# Patient Record
Sex: FEMALE
[De-identification: names, ages, dates, MRNs, and addresses within clinical notes are randomized per-mention and may not be internally consistent; named-entity substitution may affect disease eponyms.]

---

## 2019-02-15 ENCOUNTER — HOSPITAL ENCOUNTER (EMERGENCY)
Dept: HOSPITAL 97 - ER | Age: 29
LOS: 1 days | Discharge: HOME | End: 2019-02-16
Payer: COMMERCIAL

## 2019-02-15 DIAGNOSIS — K29.80: ICD-10-CM

## 2019-02-15 DIAGNOSIS — I88.0: ICD-10-CM

## 2019-02-15 DIAGNOSIS — K29.70: Primary | ICD-10-CM

## 2019-02-15 LAB
ALBUMIN SERPL BCP-MCNC: 4.5 G/DL (ref 3.4–5)
ALP SERPL-CCNC: 56 U/L (ref 45–117)
ALT SERPL W P-5'-P-CCNC: 13 U/L (ref 12–78)
AST SERPL W P-5'-P-CCNC: 11 U/L (ref 15–37)
BUN BLD-MCNC: 6 MG/DL (ref 7–18)
GLUCOSE SERPLBLD-MCNC: 104 MG/DL (ref 74–106)
HCT VFR BLD CALC: 38 % (ref 36–45)
LIPASE SERPL-CCNC: 65 U/L (ref 73–393)
LYMPHOCYTES # SPEC AUTO: 1.2 K/UL (ref 0.7–4.9)
PMV BLD: 9.8 FL (ref 7.6–11.3)
POTASSIUM SERPL-SCNC: 3.3 MMOL/L (ref 3.5–5.1)
RBC # BLD: 4.21 M/UL (ref 3.86–4.86)

## 2019-02-15 PROCEDURE — 99284 EMERGENCY DEPT VISIT MOD MDM: CPT

## 2019-02-15 PROCEDURE — 81025 URINE PREGNANCY TEST: CPT

## 2019-02-15 PROCEDURE — 83690 ASSAY OF LIPASE: CPT

## 2019-02-15 PROCEDURE — 96375 TX/PRO/DX INJ NEW DRUG ADDON: CPT

## 2019-02-15 PROCEDURE — 80076 HEPATIC FUNCTION PANEL: CPT

## 2019-02-15 PROCEDURE — 96365 THER/PROPH/DIAG IV INF INIT: CPT

## 2019-02-15 PROCEDURE — 36415 COLL VENOUS BLD VENIPUNCTURE: CPT

## 2019-02-15 PROCEDURE — 80048 BASIC METABOLIC PNL TOTAL CA: CPT

## 2019-02-15 PROCEDURE — 81003 URINALYSIS AUTO W/O SCOPE: CPT

## 2019-02-15 PROCEDURE — 85025 COMPLETE CBC W/AUTO DIFF WBC: CPT

## 2019-02-15 PROCEDURE — 74177 CT ABD & PELVIS W/CONTRAST: CPT

## 2019-02-15 PROCEDURE — 84703 CHORIONIC GONADOTROPIN ASSAY: CPT

## 2019-02-16 NOTE — EDPHYS
Physician Documentation                                                                           

 [facility]                                                                

Name: Winnie May                                                                                

Age: 28 yrs                                                                                       

Sex: Female                                                                                       

: 1990                                                                                   

MRN: [id number]                                                                                   

Arrival Date: 02/15/2019                                                                          

Time: 21:50                                                                                       

Account#: [id number]                                                                            

Bed 6                                                                                             

Private MD:                                                                                       

ED Physician Sandeep Tidwell                                                                    

HPI:                                                                                              

02/15                                                                                             

22:42 This 28 yrs old  Female presents to ER via Ambulatory with complaints of abd    ma2 

      pain.                                                                                       

22:42 The patient presents with abdominal pain. Onset: The symptoms/episode began/occurred    ma2 

      gradually, 1 day(s) ago. Associated signs and symptoms: Pertinent negatives: nausea and     

      vomiting, blood in stools, diarrhea, shortness of breath, vomiting. The symptoms are        

      described as achy. Severity of pain: At its worst the pain was moderate in the              

      emergency department the pain is unchanged. The patient has not experienced similar         

      symptoms in the past.                                                                       

                                                                                                  

OB/GYN:                                                                                           

21:53 LMP 2019                                                                            ak1 

                                                                                                  

Historical:                                                                                       

- Allergies:                                                                                      

21:53 No Known Allergies;                                                                     ak1 

- Home Meds:                                                                                      

21:53 None [Active];                                                                          ak1 

- PMHx:                                                                                           

21:53 None;                                                                                   ak1 

- PSHx:                                                                                           

21:53 None;                                                                                   ak1 

                                                                                                  

- Immunization history:: Adult Immunizations unknown.                                             

- Social history:: Smoking status: Patient/guardian denies using tobacco,                         

  Patient/guardian denies using alcohol, street drugs, The patient lives with family.             

- Ebola Screening: : No symptoms or risks identified at this time.                                

- Family history:: not pertinent.                                                                 

                                                                                                  

                                                                                                  

ROS:                                                                                              

22:42 Constitutional: Negative for fever, chills, and weight loss.                            ma2 

22:42 Abdomen/GI: Positive for abdominal pain, Negative for nausea and vomiting, nausea,          

      vomiting, and diarrhea, vomiting, abdominal distension, rectal bleeding, flatulence.        

22:42 All other systems are negative.                                                             

                                                                                                  

Exam:                                                                                             

22:42 Constitutional:  This is a well developed, well nourished patient who is awake, alert,  ma2 

      and in no acute distress. Chest/axilla:  Normal chest wall appearance and motion.           

      Nontender with no deformity.  No lesions are appreciated. Cardiovascular:  Regular rate     

      and rhythm with a normal S1 and S2.  No gallops, murmurs, or rubs.  Normal PMI, no JVD.     

       No pulse deficits. Respiratory:  Lungs have equal breath sounds bilaterally, clear to      

      auscultation and percussion.  No rales, rhonchi or wheezes noted.  No increased work of     

      breathing, no retractions or nasal flaring. Abdomen/GI:  Soft, non-tender, with normal      

      bowel sounds.  No distension or tympany.  No guarding or rebound.  No evidence of           

      tenderness throughout. Back:  No spinal tenderness.  No costovertebral tenderness.          

      Full range of motion. MS/ Extremity:  Pulses equal, no cyanosis.  Neurovascular intact.     

       Full, normal range of motion. Neuro:  Awake and alert, GCS 15, oriented to person,         

      place, time, and situation.  Cranial nerves II-XII grossly intact.  Motor strength 5/5      

      in all extremities.  Sensory grossly intact.  Cerebellar exam normal.  Normal gait.         

                                                                                                  

Vital Signs:                                                                                      

21:53  / 73; Pulse 91; Resp 18; Temp 99.0(O); Pulse Ox 100% on R/A; Weight 77.11 kg     ak1 

      (R); Height 5 ft. 7 in. (170.18 cm) (R); Pain 6/10;                                         

22:25  / 69; Pulse 88; Resp 17; Pulse Ox 100% on R/A; Pain 2/10;                        tl1 

23:29  / 75; Pulse 79; Resp 17; Pulse Ox 100% ; Pain 0/10;                              tl1 

                                                                                             

00:35  / 66; Pulse 77; Resp 17; Temp 98.7; Pulse Ox 100% ; Pain 0/10;                   tl1 

02/15                                                                                             

21:53 Body Mass Index 26.63 (77.11 kg, 170.18 cm)                                             ak1 

                                                                                                  

MDM:                                                                                              

02/15                                                                                             

21:50 Patient medically screened.                                                             ma2 

22:42 Differential diagnosis: diverticulitis, Ectopic Pregnancy, gastritis, gastroesophageal  ma2 

      reflux disease, Irritable bowel syndrome.                                                   

                                                                                             

00:26 Data reviewed: vital signs, nurses notes. Counseling: I had a detailed discussion with  ma2 

      the patient and/or guardian regarding: the historical points, exam findings, and any        

      diagnostic results supporting the discharge/admit diagnosis, the presence of at least       

      one elevated blood pressure reading (>120/80) during this emergency department visit.       

      Response to treatment: the patient's symptoms have markedly improved after treatment.       

                                                                                                  

02/15                                                                                             

22:07 Order name: Urine Dipstick--Ancillary (enter results); Complete Time: 23:38             ag4 

02/15                                                                                             

22:07 Order name: Urine Pregnancy--Ancillary (enter results); Complete Time: 23:38            4 

02/15                                                                                             

22:09 Order name: Basic Metabolic Panel; Complete Time: 23:38                                 ma2 

02/15                                                                                             

22:09 Order name: CBC with Diff; Complete Time: 23:38                                         ma2 

02/15                                                                                             

22:09 Order name: Creatinine for Radiology; Complete Time: 23:38                              ma2 

02/15                                                                                             

22:09 Order name: Hepatic Function; Complete Time: 23:38                                      ma2 

02/15                                                                                             

22:09 Order name: Lipase; Complete Time: 23:38                                                ma2 

02/15                                                                                             

22:09 Order name: IV Saline Lock; Complete Time: 22:34                                        ma2 

02/15                                                                                             

22:09 Order name: Labs collected and sent; Complete Time: 22:34                               [facility] 

02/15                                                                                             

22:09 Order name: Pregnancy Test, Serum; Complete Time: 23:38                                 ma2 

02/15                                                                                             

22:09 Order name: CT Abd/Pelvis - W/Contrast                                                  [facility] 

02/15                                                                                             

22:09 Order name: Urine Dipstick-Ancillary (obtain specimen); Complete Time: 22:22            ma2 

                                                                                                  

Administered Medications:                                                                         

02/15                                                                                             

22:21 Drug: Rocephin 1 grams Route: IV; Rate: calculated rate; Infused Over: 5 mins; Site:    tl1 

      right antecubital;                                                                          

22:53 Follow up: IV Status: Completed infusion                                                tl1 

22:21 Drug: TORadol 30 mg Route: IVP; Infused Over: 2 mins; Site: right antecubital;          tl1 

22:53 Follow up: Response: No adverse reaction; Marked relief of symptoms; Pain is decreased  tl1 

22:22 Drug: NS 0.9% 1000 ml Route: IV; Rate: 1 bolus; Site: right antecubital;                tl1 

22:53 Follow up: IV Status: Completed infusion                                                tl1 

                                                                                                  

                                                                                                  

Disposition:                                                                                      

19 00:26 Discharged to Home. Impression: Gastritis and duodenitis.                          

- Condition is Stable.                                                                            

- Discharge Instructions: Gastritis, Adult.                                                       

- Prescriptions for Tylenol- Codeine #3 300-30 mg Oral Tablet - take 2 tablet by ORAL             

  route every 6 hours As needed; 30 tablet. Pepcid 20 mg Oral Tablet - take 1 tablet by           

  ORAL route once daily for 10 days; 10 tablet.                                                   

- Medication Reconciliation Form, Thank You Letter, Antibiotic Education, Prescription            

  Opioid Use form.                                                                                

- Follow up: Private Physician; When: Tomorrow; Reason: Continuance of care.                      

                                                                                                  

                                                                                                  

                                                                                                  

Signatures:                                                                                       

Dispatcher [employer]                           Obdulia Cunningham RN                      RN   tl1                                                  

Margoth Verdin RN                       RN   ak1                                                  

Sandeep Tidwell MD MD   ma2                                                  

                                                                                                  

Corrections: (The following items were deleted from the chart)                                    

                                                                                             

00:37 00:26 2019 00:26 Discharged to Home. Impression: Gastritis and duodenitis.        tl1 

      Condition is Stable. Forms are Medication Reconciliation Form, Thank You Letter,            

      Antibiotic Education, Prescription Opioid Use. Follow up: Private Physician; When:          

      Tomorrow; Reason: Continuance of care. ma2                                                  

                                                                                                  

**************************************************************************************************

## 2019-02-16 NOTE — ER
Nurse's Notes                                                                                     

 [facility]                                                                

Name: Winnie May                                                                                

Age: 28 yrs                                                                                       

Sex: Female                                                                                       

: 1990                                                                                   

MRN: [id number]                                                                                   

Arrival Date: 02/15/2019                                                                          

Time: 21:50                                                                                       

Account#: [id number]                                                                            

Bed 6                                                                                             

Private MD:                                                                                       

Diagnosis: Gastritis and duodenitis                                                               

                                                                                                  

Presentation:                                                                                     

02/15                                                                                             

21:52 Presenting complaint: Patient states: left lower abd pain that radiates to left flank.  ak1 

      pt c/o vomiting X1 this morning and diarrhea X1 this morning. pt denies burning with        

      urination, pt denies N/V at this time. Transition of care: patient was not received         

      from another setting of care. Onset of symptoms was February 15, 2019. Risk Assessment:     

      Do you want to hurt yourself or someone else? Patient reports no desire to harm self or     

      others. Initial Sepsis Screen: Does the patient meet any 2 criteria? No. Patient's          

      initial sepsis screen is negative. Does the patient have a suspected source of              

      infection? No. Patient's initial sepsis screen is negative. Care prior to arrival: None.    

21:52 Method Of Arrival: Ambulatory                                                           ak 

21:52 Acuity: MONTSERRAT 3                                                                           ak1 

                                                                                                  

Triage Assessment:                                                                                

21:53 General: Appears in no apparent distress. Behavior is calm, cooperative, anxious.       ak1 

                                                                                                  

OB/GYN:                                                                                           

21:53 LMP 2019                                                                            ak 

                                                                                                  

Historical:                                                                                       

- Allergies:                                                                                      

21:53 No Known Allergies;                                                                     ak1 

- Home Meds:                                                                                      

21:53 None [Active];                                                                          ak1 

- PMHx:                                                                                           

21:53 None;                                                                                   ak1 

- PSHx:                                                                                           

21:53 None;                                                                                   ak1 

                                                                                                  

- Immunization history:: Adult Immunizations unknown.                                             

- Social history:: Smoking status: Patient/guardian denies using tobacco,                         

  Patient/guardian denies using alcohol, street drugs, The patient lives with family.             

- Ebola Screening: : No symptoms or risks identified at this time.                                

- Family history:: not pertinent.                                                                 

                                                                                                  

                                                                                                  

Screenin:01 Abuse screen: Denies threats or abuse. Nutritional screening: No deficits noted.        jd3 

      Tuberculosis screening: No symptoms or risk factors identified. Fall Risk Ambulatory        

      Aid- None/Bed Rest/Nurse Assist (0 pts). Gait- Normal/Bed Rest/Wheelchair (0 pts)           

      Mental Status- Oriented to own ability (0 pts). Total Lucio Fall Scale indicates No         

      Risk (0-24 pts).                                                                            

                                                                                                  

Assessment:                                                                                       

21:58 General: Appears in no apparent distress. uncomfortable, Behavior is calm, cooperative, jd3 

      appropriate for age. Pain: Complains of pain in posterior aspect of left lateral            

      abdomen Quality of pain is described as aching, tender. Neuro: Level of Consciousness       

      is awake, alert, obeys commands, Oriented to person, place, time, situation.                

      Cardiovascular: Capillary refill < 3 seconds Patient's skin is warm and dry.                

      Respiratory: Airway is patent Respiratory effort is even, unlabored, Respiratory            

      pattern is regular, symmetrical. GI: Abdomen is flat, non-distended, Bowel sounds           

      present X 4 quads. Abdomen is tender to palpation in posterior aspect of left lateral       

      abdomen, left upper quadrant and left lower quadrant Reports. : Reports small amount      

      of blood in the urine. EENT: No signs and/or symptoms were reported regarding the EENT      

      system. Derm: Skin is intact, Skin is dry, Skin is normal, Skin temperature is warm.        

      Musculoskeletal: Circulation, motion, and sensation intact. Range of motion: intact in      

      all extremities.                                                                            

                                                                                             

00:35 Reassessment: Patient and/or family updated on plan of care and expected duration. Pain tl1 

      level reassessed. Patient is alert, oriented x 3, equal unlabored respirations, skin        

      warm/dry/pink. Patient states feeling better. Patient states symptoms have improved.        

      GI: Bowel sounds present X 4 quads. Abd is soft and non tender X 4 quads.                   

                                                                                                  

Vital Signs:                                                                                      

02/15                                                                                             

21:53  / 73; Pulse 91; Resp 18; Temp 99.0(O); Pulse Ox 100% on R/A; Weight 77.11 kg     ak1 

      (R); Height 5 ft. 7 in. (170.18 cm) (R); Pain 6/10;                                         

22:25  / 69; Pulse 88; Resp 17; Pulse Ox 100% on R/A; Pain 2/10;                        tl1 

23:29  / 75; Pulse 79; Resp 17; Pulse Ox 100% ; Pain 0/10;                              tl1 

                                                                                             

00:35  / 66; Pulse 77; Resp 17; Temp 98.7; Pulse Ox 100% ; Pain 0/10;                   tl1 

02/15                                                                                             

21:53 Body Mass Index 26.63 (77.11 kg, 170.18 cm)                                             ak1 

                                                                                                  

ED Course:                                                                                        

02/15                                                                                             

21:50 Patient arrived in ED.                                                                  ak1 

21:50 Sandeep Tidwell MD is Attending Physician.                                           ma2 

21:53 Triage completed.                                                                       ak1 

21:53 Live Corea, RN is Primary Nurse.                                                  jd3 

21:53 Arm band placed on Patient placed in an exam room, on a stretcher, Patient notified of  ak1 

      wait time.                                                                                  

22:01 Patient has correct armband on for positive identification. Placed in gown. Bed in low  jd3 

      position. Call light in reach. Side rails up X 1. Adult w/ patient.                         

22:05 No provider procedures requiring assistance completed. Inserted saline lock: 20 gauge   tl1 

      in right antecubital area, using aseptic technique. Blood collected.                        

22:10 Radiology exam delayed due to lab results not completed at this time. (BUN/Creatinine). 2 

22:51 Patient moved to CT via wheelchair.                                                     nj  

23:02 CT completed. Patient tolerated procedure well. Patient moved back from CT.             nj  

23:13 CT Abd/Pelvis - W/Contrast In Process Unspecified.                                      EDMS

                                                                                             

00:37 IV discontinued, intact, bleeding controlled, No redness/swelling at site. Pressure     tl1 

      dressing applied.                                                                           

                                                                                                  

Administered Medications:                                                                         

02/15                                                                                             

22:21 Drug: Rocephin 1 grams Route: IV; Rate: calculated rate; Infused Over: 5 mins; Site:    tl1 

      right antecubital;                                                                          

22:53 Follow up: IV Status: Completed infusion                                                tl1 

22:21 Drug: TORadol 30 mg Route: IVP; Infused Over: 2 mins; Site: right antecubital;          tl1 

22:53 Follow up: Response: No adverse reaction; Marked relief of symptoms; Pain is decreased  tl1 

22:22 Drug: NS 0.9% 1000 ml Route: IV; Rate: 1 bolus; Site: right antecubital;                tl1 

22:53 Follow up: IV Status: Completed infusion                                                tl1 

                                                                                                  

                                                                                                  

Outcome:                                                                                          

                                                                                             

00:26 Discharge ordered by MD.                                                                ma2 

00:36 Discharged to home ambulatory, with family.                                             tl1 

00:36 Condition: good                                                                             

00:36 Discharge instructions given to patient, family, Instructed on discharge instructions,      

      follow up and referral plans. medication usage, Demonstrated understanding of               

      instructions, follow-up care, medications, Prescriptions given X 2.                         

00:37 Patient left the ED.                                                                    tl1 

                                                                                                  

Signatures:                                                                                       

Dispatcher [facility]                           Obdulia Cunningham, RN                      RN   tl1                                                  

Margoth Verdin RN                       RN   ak1                                                  

Beny Garcia Victoria vm2                                                  

Live Corea RN                    RN   jd3                                                  

Sandeep Tidwell MD MD   ma2                                                  

                                                                                                  

**************************************************************************************************

## 2019-02-18 NOTE — RAD REPORT
EXAM DESCRIPTION:  CT Abdomen and Pelvis With Contrast

 

CLINICAL HISTORY:  ABD PAIN.

 

COMPARISON:  None.

 

TECHNIQUE:  Contiguous axial sections are obtained through the abdomen and pelvis as per protocol aft
er administration of iodinated contrast. Oral contrast was not administered. Sagittal and coronal ref
ormations were obtained.

Automatic exposure control (AEC), mA and/or kV adjustment by patient size, and/or iterative reconstru
ctive technique was use, per departmental dose optimization program, during the performance of the CT
 examination.

 

FINDINGS:  The  view demonstrates no abnormalities.

 

The visualized lung bases demonstrates no abnormalities.

The liver is normal in size and demonstrates normal attenuation and enhancement. The spleen, pancreas
, adrenal glands appear normal in size and attenuation without any focal abnormalities. The gallbladd
er is normal.

 

Kidneys demonstrate no evidence of calculi. Kidneys are normal in size, shape, attenuation and enhanc
ement. Focal parenchymal loss is noted in the left kidney.

The aorta, IVC and retroperitoneal structures appear normal.

The stomach appears unremarkable.

The small bowel loops appear unremarkable. The appendix is normal. The colon is unremarkable. Evidenc
e of mesenteric adenitis is noted.

No evidence of free intraperitoneal fluid or air is noted.

 

CT examination of the pelvis demonstrates no evidence of mass or adenopathy. The urinary bladder appe
ars normal.

The reproductive organs are unremarkable.

Inguinal regions are unremarkable.

 

Bony structures are unremarkable.

 

IMPRESSION:  Mesenteric adenitis.

 

Electronically signed by Nadege iNchols MD 02/15/2019 11:19 PM CST Workstation: [id number]

 

 

Due to temporary technical issues with the PACS/Fluency reporting system, reports are being signed by
 the in house radiologist as a courtesy to ensure prompt reporting. The interpreting radiologist is f
ully responsible for the content of the report.